# Patient Record
Sex: FEMALE | Race: WHITE | NOT HISPANIC OR LATINO | Employment: FULL TIME | URBAN - METROPOLITAN AREA
[De-identification: names, ages, dates, MRNs, and addresses within clinical notes are randomized per-mention and may not be internally consistent; named-entity substitution may affect disease eponyms.]

---

## 2017-01-13 DIAGNOSIS — L70.0 ACNE VULGARIS: ICD-10-CM

## 2017-01-13 DIAGNOSIS — Z30.41 ENCOUNTER FOR SURVEILLANCE OF CONTRACEPTIVE PILLS: Primary | ICD-10-CM

## 2017-01-13 RX ORDER — LEVONORGESTREL/ETHIN.ESTRADIOL 0.1-0.02MG
1 TABLET ORAL DAILY
Qty: 84 TABLET | Refills: 3 | Status: CANCELLED | OUTPATIENT
Start: 2017-01-13

## 2017-03-07 ENCOUNTER — TRANSFERRED RECORDS (OUTPATIENT)
Dept: HEALTH INFORMATION MANAGEMENT | Facility: CLINIC | Age: 22
End: 2017-03-07

## 2017-03-07 ENCOUNTER — MEDICAL CORRESPONDENCE (OUTPATIENT)
Dept: HEALTH INFORMATION MANAGEMENT | Facility: CLINIC | Age: 22
End: 2017-03-07

## 2017-08-01 ENCOUNTER — TELEPHONE (OUTPATIENT)
Dept: PEDIATRICS | Facility: CLINIC | Age: 22
End: 2017-08-01

## 2017-08-01 DIAGNOSIS — F41.9 ANXIETY: ICD-10-CM

## 2017-08-01 RX ORDER — ESCITALOPRAM OXALATE 10 MG/1
10 TABLET ORAL DAILY
Qty: 30 TABLET | Refills: 6 | Status: CANCELLED | OUTPATIENT
Start: 2017-08-01

## 2017-08-01 NOTE — TELEPHONE ENCOUNTER
Due for 6 month follow up visit.      - please schedule and then send to RN if refill needed to get through.     Bethany Chu RN   [facility]

## 2017-08-03 DIAGNOSIS — L70.0 ACNE VULGARIS: ICD-10-CM

## 2017-08-03 DIAGNOSIS — Z30.41 ENCOUNTER FOR SURVEILLANCE OF CONTRACEPTIVE PILLS: ICD-10-CM

## 2017-08-03 RX ORDER — LEVONORGESTREL/ETHIN.ESTRADIOL 0.1-0.02MG
1 TABLET ORAL DAILY
Qty: 84 TABLET | Refills: 4 | Status: SHIPPED | OUTPATIENT
Start: 2017-08-03 | End: 2018-03-23

## 2017-08-08 ENCOUNTER — TELEPHONE (OUTPATIENT)
Dept: PEDIATRICS | Facility: CLINIC | Age: 22
End: 2017-08-08

## 2017-08-08 DIAGNOSIS — F41.9 ANXIETY: ICD-10-CM

## 2017-08-08 RX ORDER — ESCITALOPRAM OXALATE 10 MG/1
10 TABLET ORAL DAILY
Qty: 30 TABLET | Refills: 0 | Status: SHIPPED | OUTPATIENT
Start: 2017-08-08 | End: 2017-08-17

## 2017-08-08 NOTE — TELEPHONE ENCOUNTER
Reason for Call:  Medication or medication refill:    Do you use a [facility]?  Name of the pharmacy and phone number for the current request:  [facility]     Name of the medication requested: escitalopram (LEXAPRO) 10 MG tablet    Other request: please call when this has been completed     Can we leave a detailed message on this number? YES    Phone number patient can be reached at: Home number on file [phone] (home)    Best Time:     Call taken on 8/8/2017 at 8:58 AM by Vanesa Esquivel

## 2017-08-08 NOTE — TELEPHONE ENCOUNTER
Patient is due for 6 month recheck. We have tried reaching her by phone in previous encounters.     Left message for patient/parent to return call to clinic. Direct line given. [phone]   Bethany Chu RN  [facility]

## 2017-08-08 NOTE — TELEPHONE ENCOUNTER
Spoke with patient. She is currently living in [place] for school. She is coming back home next week for a visit. Appointment made with PCP and 1 month refill sent to her preferred pharmacy to get her through until appointment next week.     No further questions or concerns at this time.  Bethany Chu RN   [facility]

## 2017-08-09 ENCOUNTER — TELEPHONE (OUTPATIENT)
Dept: PEDIATRICS | Facility: CLINIC | Age: 22
End: 2017-08-09

## 2017-08-09 ENCOUNTER — NURSE TRIAGE (OUTPATIENT)
Dept: NURSING | Facility: CLINIC | Age: 22
End: 2017-08-09

## 2017-08-09 ENCOUNTER — TELEPHONE (OUTPATIENT)
Dept: FAMILY MEDICINE | Facility: CLINIC | Age: 22
End: 2017-08-09

## 2017-08-10 NOTE — TELEPHONE ENCOUNTER
"I am on call today and received a page regarding this patient with the message \"RN unable to triage\". I called the patient to discuss the concern.     For about 3 day(s) now she started to feel really \"off\".     She has not had the escitalopram for about a week.    She has been dizzy and off balance when she is walking or standing.     Her feet and hands have been numb and tingling     She is in [place].    She describes her dizziness \"like a head rush\"    She denies any weakness, visual difficulty, speaking difficulty, loss of conciousness .      She is lying back to [place] soon and has an appointment(s) with Kevin Machuca next week.   She reports that she is on her way to  her prescription for escitalopram right now.    I recommend(ed) that she take one pill tonight and one in the morning and if she does not feel better tomorrow afternoon she should be seen at a clinic or urgent care near where she is.       "

## 2017-08-10 NOTE — TELEPHONE ENCOUNTER
Dhara is in [place]. RN unable to triage patients in [place]. Paged Dr Tenorio to call her, [phone]. Paged at 7pm  Dhara will call back if no call received by 7:10 cdt  Connie ALLAN RN [facility]

## 2017-08-17 ENCOUNTER — OFFICE VISIT (OUTPATIENT)
Dept: PEDIATRICS | Facility: CLINIC | Age: 22
End: 2017-08-17
Payer: COMMERCIAL

## 2017-08-17 VITALS
TEMPERATURE: 97.1 F | HEIGHT: 67 IN | SYSTOLIC BLOOD PRESSURE: 109 MMHG | DIASTOLIC BLOOD PRESSURE: 68 MMHG | HEART RATE: 102 BPM | WEIGHT: 129 LBS | RESPIRATION RATE: 20 BRPM | BODY MASS INDEX: 20.25 KG/M2 | OXYGEN SATURATION: 100 %

## 2017-08-17 DIAGNOSIS — Z11.3 SCREEN FOR STD (SEXUALLY TRANSMITTED DISEASE): ICD-10-CM

## 2017-08-17 DIAGNOSIS — F41.9 ANXIETY: Primary | ICD-10-CM

## 2017-08-17 PROCEDURE — 87491 CHLMYD TRACH DNA AMP PROBE: CPT | Performed by: PHYSICIAN ASSISTANT

## 2017-08-17 PROCEDURE — 99213 OFFICE O/P EST LOW 20 MIN: CPT | Performed by: PHYSICIAN ASSISTANT

## 2017-08-17 RX ORDER — ESCITALOPRAM OXALATE 10 MG/1
10 TABLET ORAL DAILY
Qty: 30 TABLET | Refills: 5 | Status: SHIPPED | OUTPATIENT
Start: 2017-08-17 | End: 2018-02-26

## 2017-08-17 ASSESSMENT — ANXIETY QUESTIONNAIRES
2. NOT BEING ABLE TO STOP OR CONTROL WORRYING: MORE THAN HALF THE DAYS
5. BEING SO RESTLESS THAT IT IS HARD TO SIT STILL: NOT AT ALL
6. BECOMING EASILY ANNOYED OR IRRITABLE: MORE THAN HALF THE DAYS
GAD7 TOTAL SCORE: 6
7. FEELING AFRAID AS IF SOMETHING AWFUL MIGHT HAPPEN: NOT AT ALL
3. WORRYING TOO MUCH ABOUT DIFFERENT THINGS: SEVERAL DAYS
IF YOU CHECKED OFF ANY PROBLEMS ON THIS QUESTIONNAIRE, HOW DIFFICULT HAVE THESE PROBLEMS MADE IT FOR YOU TO DO YOUR WORK, TAKE CARE OF THINGS AT HOME, OR GET ALONG WITH OTHER PEOPLE: NOT DIFFICULT AT ALL
1. FEELING NERVOUS, ANXIOUS, OR ON EDGE: SEVERAL DAYS

## 2017-08-17 ASSESSMENT — PATIENT HEALTH QUESTIONNAIRE - PHQ9
5. POOR APPETITE OR OVEREATING: NOT AT ALL
SUM OF ALL RESPONSES TO PHQ QUESTIONS 1-9: 1

## 2017-08-17 NOTE — PROGRESS NOTES
"SUBJECTIVE:                                                    Dhara Holley is a 21 year old female who presents to clinic today  because of:    Chief Complaint   Patient presents with     Recheck Medication        HPI  Depression Follow-Up    Status since last visit: Improved     See PHQ-9 for current symptoms.    Other associated symptoms:None    Complicating factors:   Significant life event: No   Current substance abuse: None  Anxiety / Panic symptoms: No  PHQ-9  English PHQ-9   Any Language        Dhara is here for follow up on anxiety symptoms.  She has had stable mood symptoms on Lexapro 10 mg for several years.  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  She had some dizziness and numbness in hands and feet after stopping the medication abruptly due to no refills.  This did resolve with restarting the medication.  She doesn't like the feeling that her body would react that way and feels she would like to stop the medication or at least lessen the dose as she feels this is weird a medication can \"control\" her like that.  She denies any suicidal thoughts.  No concerns of sadness or depression and overall anxiety is much improved.        ROS  GENERAL: Fever - no; Poor appetite - no; Sleep disruption - no  SKIN: Rash - No; Hives - No; Eczema - No;  EYE: Pain - No; Discharge - No; Redness - No; Itching - No; Vision Problems - No;  ENT: Ear Pain - No; Runny nose - No; Congestion - No; Sore Throat - No;  RESP: Cough - No; Wheezing - No; Difficulty Breathing - No;  GI: Vomiting - No; Diarrhea - No; Abdominal Pain - No; Constipation - No;  NEURO: Headache - No; Weakness - No;      PROBLEM LIST  Patient Active Problem List    Diagnosis Date Noted     Anxiety 10/23/2013     Priority: Medium     Acne 04/05/2012     Priority: Medium      MEDICATIONS  Current Outpatient Prescriptions   Medication Sig Dispense " "Refill     escitalopram (LEXAPRO) 10 MG tablet Take 1 tablet (10 mg) by mouth daily 30 tablet 5     [DISCONTINUED] escitalopram (LEXAPRO) 10 MG tablet Take 1 tablet (10 mg) by mouth daily 30 tablet 0     levonorgestrel-ethinyl estradiol (AVIANE,ALESSE,LESSINA) 0.1-20 MG-MCG per tablet Take 1 tablet by mouth daily Take continuously for extended cycling 84 tablet 4      ALLERGIES  No Known Allergies    Reviewed and updated as needed this visit by clinical staff  Tobacco  Allergies  Meds  Problems         Reviewed and updated as needed this visit by Provider  Meds  Problems       OBJECTIVE:                                                      /68  Pulse 102  Temp 97.1  F (36.2  C) (Oral)  Resp 20  Ht 5' 6.5\" (1.689 m)  Wt 129 lb (58.5 kg)  SpO2 100%  BMI 20.51 kg/m2  Normalized stature-for-age data not available for patients older than 20 years.  Normalized weight-for-age data not available for patients older than 20 years.  Normalized BMI data available only for age 0 to 20 years.  Normalized stature-for-age data not available for patients older than 20 years.    Good historian, normal affect    DIAGNOSTICS:   EZIO-7 SCORE 4/27/2016 12/20/2016 8/17/2017   Total Score - - -   Total Score 6 8 6     PHQ-9 SCORE 4/27/2016 12/20/2016 8/17/2017   Total Score - - -   Total Score 3 3 1         ASSESSMENT/PLAN:                                                    1. Anxiety  Discussed trial of 5 mg versus 10 mg but this is not really a therapeutic dose.  She can taper off by using 5 mg daily for 2 weeks then 5 mg every other day for 2 weeks and stop.  If the taper appears to worsen anxiety symptoms then go back to the regular 10 mg dose daily.  Follow up as needed by Dayna if concerns.  She will graduate [employer] in December and then will be living in [place] and looking for employment.  Does not plan on moving back to [place] after college graduation.  - escitalopram (LEXAPRO) 10 MG tablet; Take 1 tablet (10 mg) by mouth " daily  Dispense: 30 tablet; Refill: 5    2. Screen for STD (sexually transmitted disease)  Recheck from positive Chlamydia test in December.   - Chlamydia trachomatis PCR    FOLLOW UPin 6 month(s)    Total time spent with patient was 20 minutes with greater than 50% of this time spent in counseling and coordination of care.    NICO SanchesC

## 2017-08-17 NOTE — MR AVS SNAPSHOT
"              After Visit Summary   8/17/2017    Dhara Holley    MRN: [id number]           Patient Information     Date Of Birth          1995        Visit Information        Provider Department      8/17/2017 11:50 AM Emily Machuca PA-C [facility]        Today's Diagnoses     Anxiety    -  1    Screen for STD (sexually transmitted disease)           Follow-ups after your visit        Who to contact     If you have questions or need follow up information about today's clinic visit or your schedule please contact [facility] directly at [phone].  Normal or non-critical lab and imaging results will be communicated to you by MD Synergy Solutionshart, letter or phone within 4 business days after the clinic has received the results. If you do not hear from us within 7 days, please contact the clinic through Snowmant or phone. If you have a critical or abnormal lab result, we will notify you by phone as soon as possible.  Submit refill requests through ePantry or call your pharmacy and they will forward the refill request to us. Please allow 3 business days for your refill to be completed.          Additional Information About Your Visit        MyChart Information     ePantry gives you secure access to your electronic health record. If you see a primary care provider, you can also send messages to your care team and make appointments. If you have questions, please call your primary care clinic.  If you do not have a primary care provider, please call [phone] and they will assist you.        Care EveryWhere ID     This is your Care EveryWhere ID. This could be used by other organizations to access your [facility] medical records  [id number]        Your Vitals Were     Pulse Temperature Respirations Height Pulse Oximetry BMI (Body Mass Index)    102 97.1  F (36.2  C) (Oral) 20 5' 6.5\" (1.689 m) 100% 20.51 kg/m2       Blood Pressure from Last 3 Encounters:   08/17/17 109/68   12/20/16 123/80 "   04/27/16 107/67    Weight from Last 3 Encounters:   08/17/17 129 lb (58.5 kg)   12/20/16 119 lb (54 kg)   04/27/16 127 lb (57.6 kg)              We Performed the Following     Chlamydia trachomatis PCR          Where to get your medicines      These medications were sent to [facility]/pharmacy #[id number] - [place], [place] - [address] AT [facility] [address]  [address], [place] [address]     Phone:  [phone]     escitalopram 10 MG tablet          Primary Care Provider Office Phone # Fax #    Emily Machuca PA-C [phone] [id number] [facility] [address]        Equal Access to Services     JACK CROWLEY : Hadii aad ku hadasho Soleonidali, waaxda luqadaha, qaybta kaalmada adeegyada, tonya rodriguez . So [facility] [phone].    ATENCIÓN: Si habla español, tiene a mederos disposición servicios gratuitos de asistencia lingüística. Ll[facility] [phone].    We comply with applicable federal civil rights laws and [place] laws. We do not discriminate on the basis of race, color, national origin, age, disability sex, sexual orientation or gender identity.            Thank you!     Thank you for choosing [facility]  for your care. Our goal is always to provide you with excellent care. Hearing back from our patients is one way we can continue to improve our services. Please take a few minutes to complete the written survey that you may receive in the mail after your visit with us. Thank you!             Your Updated Medication List - Protect others around you: Learn how to safely use, store and throw away your medicines at www.disposemymeds.org.          This list is accurate as of: 8/17/17  1:10 PM.  Always use your most recent med list.                   Brand Name Dispense Instructions for use Diagnosis    escitalopram 10 MG tablet    LEXAPRO    30 tablet    Take 1 tablet (10 mg) by mouth daily    Anxiety       levonorgestrel-ethinyl estradiol 0.1-20  MG-MCG per tablet    AVIANE,ALESSE,LESSINA    84 tablet    Take 1 tablet by mouth daily Take continuously for extended cycling    Encounter for surveillance of contraceptive pills, Acne vulgaris

## 2017-08-18 LAB
C TRACH DNA SPEC QL NAA+PROBE: NEGATIVE
SPECIMEN SOURCE: NORMAL

## 2017-08-18 ASSESSMENT — ANXIETY QUESTIONNAIRES: GAD7 TOTAL SCORE: 6

## 2017-10-08 ENCOUNTER — HEALTH MAINTENANCE LETTER (OUTPATIENT)
Age: 22
End: 2017-10-08

## 2018-03-21 ENCOUNTER — TELEPHONE (OUTPATIENT)
Dept: PEDIATRICS | Facility: CLINIC | Age: 23
End: 2018-03-21

## 2018-03-21 NOTE — TELEPHONE ENCOUNTER
System would not allow scheduling for Dhara due to over 21 years old.  Please put her on schedule for this Friday 3/23 at 12:30pm to renew anxiety med. Call when done.

## 2018-03-23 ENCOUNTER — OFFICE VISIT (OUTPATIENT)
Dept: PEDIATRICS | Facility: CLINIC | Age: 23
End: 2018-03-23
Payer: COMMERCIAL

## 2018-03-23 VITALS
SYSTOLIC BLOOD PRESSURE: 113 MMHG | OXYGEN SATURATION: 98 % | RESPIRATION RATE: 14 BRPM | WEIGHT: 126 LBS | DIASTOLIC BLOOD PRESSURE: 70 MMHG | BODY MASS INDEX: 19.78 KG/M2 | HEIGHT: 67 IN | TEMPERATURE: 97.4 F | HEART RATE: 113 BPM

## 2018-03-23 DIAGNOSIS — L70.0 ACNE VULGARIS: ICD-10-CM

## 2018-03-23 DIAGNOSIS — F41.9 ANXIETY: ICD-10-CM

## 2018-03-23 DIAGNOSIS — Z30.41 ENCOUNTER FOR SURVEILLANCE OF CONTRACEPTIVE PILLS: ICD-10-CM

## 2018-03-23 PROCEDURE — 99213 OFFICE O/P EST LOW 20 MIN: CPT | Performed by: PHYSICIAN ASSISTANT

## 2018-03-23 RX ORDER — ESCITALOPRAM OXALATE 10 MG/1
10 TABLET ORAL DAILY
Qty: 90 TABLET | Refills: 3 | Status: SHIPPED | OUTPATIENT
Start: 2018-03-23 | End: 2023-02-15

## 2018-03-23 RX ORDER — LEVONORGESTREL/ETHIN.ESTRADIOL 0.1-0.02MG
1 TABLET ORAL DAILY
Qty: 84 TABLET | Refills: 4 | Status: SHIPPED | OUTPATIENT
Start: 2018-03-23 | End: 2019-03-26

## 2018-03-23 ASSESSMENT — ANXIETY QUESTIONNAIRES
6. BECOMING EASILY ANNOYED OR IRRITABLE: SEVERAL DAYS
7. FEELING AFRAID AS IF SOMETHING AWFUL MIGHT HAPPEN: NOT AT ALL
IF YOU CHECKED OFF ANY PROBLEMS ON THIS QUESTIONNAIRE, HOW DIFFICULT HAVE THESE PROBLEMS MADE IT FOR YOU TO DO YOUR WORK, TAKE CARE OF THINGS AT HOME, OR GET ALONG WITH OTHER PEOPLE: NOT DIFFICULT AT ALL
2. NOT BEING ABLE TO STOP OR CONTROL WORRYING: NOT AT ALL
1. FEELING NERVOUS, ANXIOUS, OR ON EDGE: SEVERAL DAYS
3. WORRYING TOO MUCH ABOUT DIFFERENT THINGS: NOT AT ALL
GAD7 TOTAL SCORE: 2
5. BEING SO RESTLESS THAT IT IS HARD TO SIT STILL: NOT AT ALL

## 2018-03-23 ASSESSMENT — PATIENT HEALTH QUESTIONNAIRE - PHQ9: 5. POOR APPETITE OR OVEREATING: NOT AT ALL

## 2018-03-23 NOTE — PROGRESS NOTES
SUBJECTIVE:   Dhara Holley is a 22 year old female who presents to clinic today because of:    Chief Complaint   Patient presents with     Anxiety      PHQ-9 SCORE 12/20/2016 8/17/2017 3/23/2018   Total Score - - -   Total Score 3 1 1      EZIO-7 SCORE 12/20/2016 8/17/2017 3/23/2018   Total Score - - -   Total Score 8 6 2     Dhara is here for recheck on anxiety, dysmenorrhea and acne.  She reports she is doing well in terms of anxiety but has multiple stressors in life right now and is not sure she'd be ready to reduce or stop escitalopram.  She completed her course work at [employer] in December 2017 and will walk in graduation ceremony in late April.  She has a job with [employer] in [place] and will continue to live with roommates in an apartment near [place].  She reports feeling stressed about the transition to adulthood.  No concerns with mood or sadness. She is physically active frequently and does not drink or use substances on any regularity.      She also needs a refill of OCP.  She is sexually active.  No concerns of std exposure since last normal check in August.       ROS  Constitutional, eye, ENT, skin, respiratory, cardiac, and GI are normal except as otherwise noted.    PROBLEM LIST  Patient Active Problem List    Diagnosis Date Noted     Anxiety 10/23/2013     Priority: Medium     Acne 04/05/2012     Priority: Medium      MEDICATIONS  Current Outpatient Prescriptions   Medication Sig Dispense Refill     escitalopram (LEXAPRO) 10 MG tablet Take 1 tablet (10 mg) by mouth daily Please establish care with new provider for refills. 30 tablet 1     levonorgestrel-ethinyl estradiol (AVIANE,ALESSE,LESSINA) 0.1-20 MG-MCG per tablet Take 1 tablet by mouth daily Take continuously for extended cycling 84 tablet 4      ALLERGIES  No Known Allergies    Reviewed and updated as needed this visit by clinical staff  Tobacco  Allergies  Meds  Med Hx  Surg Hx  Fam Hx  Soc Hx        Reviewed  "and updated as needed this visit by Provider       OBJECTIVE:     /70  Pulse 113  Temp 97.4  F (36.3  C) (Oral)  Resp 14  Ht 5' 7\" (1.702 m)  Wt 126 lb (57.2 kg)  SpO2 98%  BMI 19.73 kg/m2  Normalized stature-for-age data not available for patients older than 20 years.  Normalized weight-for-age data not available for patients older than 20 years.  Normalized BMI data available only for age 0 to 20 years.  Normalized stature-for-age data not available for patients older than 20 years.    Good historian, normal affect    DIAGNOSTICS: None    ASSESSMENT/PLAN:   1. Anxiety  Medication refills x12 months given.  Discussed establishing care with FP in [place] in the next year for refills.   - escitalopram (LEXAPRO) 10 MG tablet; Take 1 tablet (10 mg) by mouth daily Please establish care with new provider for refills.  Dispense: 90 tablet; Refill: 3    2. Encounter for surveillance of contraceptive pills  3. Acne vulgaris  Refills given x12 months.  Discussed establishing care in [place] with ob/gyn or FP provider.  - levonorgestrel-ethinyl estradiol (AVIANE,ALESSE,LESSINA) 0.1-20 MG-MCG per tablet; Take 1 tablet by mouth daily Take continuously for extended cycling  Dispense: 84 tablet; Refill: 4    Total time spent with patient in clinic today was 20 minutes with greater than 50% of this time spent in counseling and coordination of care.     FOLLOW UP: next preventive care visit    Emily Machuca PA-C       "

## 2018-03-23 NOTE — MR AVS SNAPSHOT
"              After Visit Summary   3/23/2018    Dhara Holley    MRN: [id number]           Patient Information     Date Of Birth          1995        Visit Information        Provider Department      3/23/2018 12:30 PM Emily Machuca PA-C [facility]        Today's Diagnoses     Anxiety        Encounter for surveillance of contraceptive pills        Acne vulgaris           Follow-ups after your visit        Who to contact     If you have questions or need follow up information about today's clinic visit or your schedule please contact [facility] directly at [phone].  Normal or non-critical lab and imaging results will be communicated to you by GooodJobhart, letter or phone within 4 business days after the clinic has received the results. If you do not hear from us within 7 days, please contact the clinic through EXO5t or phone. If you have a critical or abnormal lab result, we will notify you by phone as soon as possible.  Submit refill requests through SlideShare or call your pharmacy and they will forward the refill request to us. Please allow 3 business days for your refill to be completed.          Additional Information About Your Visit        MyChart Information     SlideShare gives you secure access to your electronic health record. If you see a primary care provider, you can also send messages to your care team and make appointments. If you have questions, please call your primary care clinic.  If you do not have a primary care provider, please call [phone] and they will assist you.        Care EveryWhere ID     This is your Care EveryWhere ID. This could be used by other organizations to access your [facility] medical records  [id number]        Your Vitals Were     Pulse Temperature Respirations Height Pulse Oximetry BMI (Body Mass Index)    113 97.4  F (36.3  C) (Oral) 14 5' 7\" (1.702 m) 98% 19.73 kg/m2       Blood Pressure from Last 3 Encounters:   03/23/18 113/70 "   08/17/17 109/68   12/20/16 123/80    Weight from Last 3 Encounters:   03/23/18 126 lb (57.2 kg)   08/17/17 129 lb (58.5 kg)   12/20/16 119 lb (54 kg)              Today, you had the following     No orders found for display         Where to get your medicines      These medications were sent to [id number] - [place], [place] - [id number]  [address], [place] [address]     Phone:  [phone]     escitalopram 10 MG tablet    levonorgestrel-ethinyl estradiol 0.1-20 MG-MCG per tablet          Primary Care Provider Office Phone # Fax #    Emily Machuca PA-C [phone] [address] [facility] [address]        Equal Access to Services     [facility]: Hadii ernesto bell hadasho Soantione, waaxda luqadaha, qaybta kaalmada adeeugeniayashandra, tonya rodriguez . So [facility] [phone].    ATENCIÓN: Si habla español, tiene a mederos disposición servicios gratuitos de asistencia lingüística. Amilcar al [phone].    We comply with applicable federal civil rights laws and [place] laws. We do not discriminate on the basis of race, color, national origin, age, disability, sex, sexual orientation, or gender identity.            Thank you!     Thank you for choosing [facility]  for your care. Our goal is always to provide you with excellent care. Hearing back from our patients is one way we can continue to improve our services. Please take a few minutes to complete the written survey that you may receive in the mail after your visit with us. Thank you!             Your Updated Medication List - Protect others around you: Learn how to safely use, store and throw away your medicines at www.disposemymeds.org.          This list is accurate as of 3/23/18 12:41 PM.  Always use your most recent med list.                   Brand Name Dispense Instructions for use Diagnosis    escitalopram 10 MG tablet    LEXAPRO    90 tablet    Take 1 tablet (10 mg) by  mouth daily Please establish care with new provider for refills.    Anxiety, Encounter for surveillance of contraceptive pills, Acne vulgaris       levonorgestrel-ethinyl estradiol 0.1-20 MG-MCG per tablet    AVIANE,ALESSE,LESSINA    84 tablet    Take 1 tablet by mouth daily Take continuously for extended cycling    Encounter for surveillance of contraceptive pills, Acne vulgaris, Anxiety

## 2018-03-24 ASSESSMENT — PATIENT HEALTH QUESTIONNAIRE - PHQ9: SUM OF ALL RESPONSES TO PHQ QUESTIONS 1-9: 1

## 2018-03-24 ASSESSMENT — ANXIETY QUESTIONNAIRES: GAD7 TOTAL SCORE: 2

## 2019-04-13 DIAGNOSIS — F41.9 ANXIETY: ICD-10-CM

## 2019-04-13 DIAGNOSIS — Z30.41 ENCOUNTER FOR SURVEILLANCE OF CONTRACEPTIVE PILLS: ICD-10-CM

## 2019-04-13 DIAGNOSIS — L70.0 ACNE VULGARIS: ICD-10-CM

## 2019-04-15 RX ORDER — ESCITALOPRAM OXALATE 10 MG/1
10 TABLET ORAL DAILY
Qty: 90 TABLET | Refills: 2 | OUTPATIENT
Start: 2019-04-15

## 2019-04-15 NOTE — TELEPHONE ENCOUNTER
TC - Please send letter to pt needs to been seen for further refills  Thank you Josie Cook R.N.

## 2019-04-15 NOTE — TELEPHONE ENCOUNTER
Patient needs to been seen for a refill it has been over a year since her last visit.    SAYDA Cary, CNP

## 2019-04-15 NOTE — TELEPHONE ENCOUNTER
\  Routing refill request to provider for review/approval because:  Patient has not been seen in over 1 year for this, she was sent a stepan letter and she is 24 yo and no pending appointment.  Josie Cook R.N.

## 2019-04-16 NOTE — TELEPHONE ENCOUNTER
Spoke with patient. She has moved to [place] and has established primary care out there.  MARCELA Titus

## 2020-02-24 ENCOUNTER — HEALTH MAINTENANCE LETTER (OUTPATIENT)
Age: 25
End: 2020-02-24

## 2020-12-13 ENCOUNTER — HEALTH MAINTENANCE LETTER (OUTPATIENT)
Age: 25
End: 2020-12-13

## 2021-04-17 ENCOUNTER — HEALTH MAINTENANCE LETTER (OUTPATIENT)
Age: 26
End: 2021-04-17

## 2021-09-26 ENCOUNTER — HEALTH MAINTENANCE LETTER (OUTPATIENT)
Age: 26
End: 2021-09-26

## 2022-05-08 ENCOUNTER — HEALTH MAINTENANCE LETTER (OUTPATIENT)
Age: 27
End: 2022-05-08

## 2023-02-15 ENCOUNTER — OFFICE VISIT (OUTPATIENT)
Dept: FAMILY MEDICINE | Facility: CLINIC | Age: 28
End: 2023-02-15
Payer: COMMERCIAL

## 2023-02-15 VITALS
SYSTOLIC BLOOD PRESSURE: 104 MMHG | DIASTOLIC BLOOD PRESSURE: 72 MMHG | HEIGHT: 67 IN | RESPIRATION RATE: 11 BRPM | BODY MASS INDEX: 21.64 KG/M2 | WEIGHT: 137.9 LBS | HEART RATE: 67 BPM | TEMPERATURE: 97.4 F | OXYGEN SATURATION: 98 %

## 2023-02-15 DIAGNOSIS — F41.1 GAD (GENERALIZED ANXIETY DISORDER): Primary | ICD-10-CM

## 2023-02-15 PROCEDURE — 99203 OFFICE O/P NEW LOW 30 MIN: CPT | Performed by: INTERNAL MEDICINE

## 2023-02-15 RX ORDER — SERTRALINE HYDROCHLORIDE 25 MG/1
25 TABLET, FILM COATED ORAL DAILY
Qty: 30 TABLET | Refills: 0 | Status: SHIPPED | OUTPATIENT
Start: 2024-03-15 | End: 2023-03-13

## 2023-02-15 ASSESSMENT — ANXIETY QUESTIONNAIRES
3. WORRYING TOO MUCH ABOUT DIFFERENT THINGS: SEVERAL DAYS
GAD7 TOTAL SCORE: 7
5. BEING SO RESTLESS THAT IT IS HARD TO SIT STILL: NOT AT ALL
GAD7 TOTAL SCORE: 7
6. BECOMING EASILY ANNOYED OR IRRITABLE: SEVERAL DAYS
1. FEELING NERVOUS, ANXIOUS, OR ON EDGE: MORE THAN HALF THE DAYS
IF YOU CHECKED OFF ANY PROBLEMS ON THIS QUESTIONNAIRE, HOW DIFFICULT HAVE THESE PROBLEMS MADE IT FOR YOU TO DO YOUR WORK, TAKE CARE OF THINGS AT HOME, OR GET ALONG WITH OTHER PEOPLE: NOT DIFFICULT AT ALL
7. FEELING AFRAID AS IF SOMETHING AWFUL MIGHT HAPPEN: SEVERAL DAYS
2. NOT BEING ABLE TO STOP OR CONTROL WORRYING: SEVERAL DAYS

## 2023-02-15 ASSESSMENT — PAIN SCALES - GENERAL: PAINLEVEL: NO PAIN (0)

## 2023-02-15 ASSESSMENT — PATIENT HEALTH QUESTIONNAIRE - PHQ9: 5. POOR APPETITE OR OVEREATING: SEVERAL DAYS

## 2023-02-15 NOTE — PROGRESS NOTES
Assessment & Plan     EZIO (generalized anxiety disorder)  Her EZIO score is very good today at 7  She has been on Zoloft for some time and wants to come off this medication as she is going to get  soon and planning to conceive  We discussed about Zoloft being the safest medication in pregnancy  Last time she tried to come of this she had a lot of side effects and ended up in the ER  I gave her a plan to taper this off over 8 weeks  We will start tapering initially with a 50 mg tablet and then switch to 25 mg taper after 4 weeks  If she develops any side effects in between the taper she can always stop the taper and go back to her normal dose  Explained to her that sometimes people cannot tolerate the taper very well and they help to stay on this medication   - sertraline (ZOLOFT) 50 MG tablet; Take 1 tablet (50 mg) by mouth daily  - sertraline (ZOLOFT) 25 MG tablet; Take 1 tablet (25 mg) by mouth daily Start the 25 mg after finishing the 50 mg      20 minutes spent on the date of the encounter doing chart review, history and exam, documentation and further activities per the note           Return in about 6 months (around 8/15/2023).    Ricci Aguirre MD  [facility] [place]    Subjective   Dhara is a 27 year old, presenting for the following health issues:  Recheck Medication      History of Present Illness       Reason for visit:  Prescription refill    She eats 4 or more servings of fruits and vegetables daily.She consumes 0 sweetened beverage(s) daily.She exercises with enough effort to increase her heart rate 30 to 60 minutes per day.  She exercises with enough effort to increase her heart rate 4 days per week.   She is taking medications regularly.             Review of Systems   Constitutional, HEENT, cardiovascular, pulmonary, gi and gu systems are negative, except as otherwise noted.      Objective    /72 (BP Location: Right arm, Patient Position: Sitting, Cuff Size: Adult  "Regular)   Pulse 67   Temp 97.4  F (36.3  C) (Oral)   Resp 11   Ht 1.71 m (5' 7.32\")   Wt 62.6 kg (137 lb 14.4 oz)   SpO2 98%   BMI 21.39 kg/m    Body mass index is 21.39 kg/m .  Physical Exam   GENERAL: healthy, alert and no distress  EYES: Eyes grossly normal to inspection, PERRL and conjunctivae and sclerae normal  MS: no gross musculoskeletal defects noted, no edema  NEURO: Normal strength and tone, mentation intact and speech normal  PSYCH: mentation appears normal, affect normal/bright                    "

## 2023-03-27 DIAGNOSIS — F41.1 GAD (GENERALIZED ANXIETY DISORDER): ICD-10-CM

## 2023-03-27 RX ORDER — SERTRALINE HYDROCHLORIDE 25 MG/1
25 TABLET, FILM COATED ORAL DAILY
Qty: 90 TABLET | Refills: 1 | Status: SHIPPED | OUTPATIENT
Start: 2023-03-27

## 2023-04-23 ENCOUNTER — HEALTH MAINTENANCE LETTER (OUTPATIENT)
Age: 28
End: 2023-04-23

## 2023-06-02 ENCOUNTER — HEALTH MAINTENANCE LETTER (OUTPATIENT)
Age: 28
End: 2023-06-02

## 2024-06-30 ENCOUNTER — HEALTH MAINTENANCE LETTER (OUTPATIENT)
Age: 29
End: 2024-06-30

## 2025-07-13 ENCOUNTER — HEALTH MAINTENANCE LETTER (OUTPATIENT)
Age: 30
End: 2025-07-13